# Patient Record
Sex: FEMALE | Race: WHITE | NOT HISPANIC OR LATINO | URBAN - METROPOLITAN AREA
[De-identification: names, ages, dates, MRNs, and addresses within clinical notes are randomized per-mention and may not be internally consistent; named-entity substitution may affect disease eponyms.]

---

## 2022-01-14 ENCOUNTER — TELEPHONE (OUTPATIENT)
Dept: SPORTS MEDICINE | Facility: CLINIC | Age: 77
End: 2022-01-14
Payer: MEDICARE

## 2022-01-14 NOTE — TELEPHONE ENCOUNTER
Spoke with patient and discussed Nusurface procedure with her as this is something we typically perform in a younger patient with specific clinical indications. Patient is going to send us her xrays for review and would like a phone call once Dr. Pike looks at her images to see if she is a candidate.     Kindra Akbar   Clinical Assistant to Dr. Eb Pike    ----- Message from Kindra Akbar sent at 1/13/2022  5:15 PM CST -----  Regarding: FW: speak with nurse  Contact: patient    ----- Message -----  From: Dottie Leo  Sent: 1/13/2022   4:26 PM CST  To: [facility] Staff  Subject: speak with nurse                                 [phone]  please call patient would like to speak with the nurse said she wants to know if the doctor performs a procedure called NUSurface if so she would like to schedule a consult waiting on a call back thanks.

## 2022-08-03 ENCOUNTER — TELEPHONE (OUTPATIENT)
Dept: SPORTS MEDICINE | Facility: CLINIC | Age: 77
End: 2022-08-03
Payer: MEDICARE

## 2022-08-03 NOTE — TELEPHONE ENCOUNTER
----- Message from Serene La sent at 8/3/2022  2:52 PM CDT -----  Yamel Raman was telephone today to discuss information regarding the Novocart 3D trials (IRB#2017.333), Eb Pike MD. I asked that the patient return my call.     Thank you,  Serene  ----- Message -----  From: Kindra Akbar  Sent: 7/27/2022   9:07 AM CDT  To: Serene Cast,    This patient was a possible Nusurface that I sent you a message about earlier this week, did you happen to speak with her about an MRI? We have not seen her in clinic yet     Kindra Akbar   Clinical Assistant to Dr. Eb Pike   ----- Message -----  From: Martin Mcmanus  Sent: 7/27/2022   9:04 AM CDT  To: [facility] Staff         Type: Patient Returning Call    Who Called: Patient   Who Left Message for Patient: NA    Does the patient know what this is regarding?: Needs the provider to call the clinic to authorize them to do a MRI on her right knee. Also needs notes of technician for MRI. She requests that the provider calls the MRI department at [facility] [phone] to authorize the MRI.     Would the patient rather a call back or a response via [facility]? Call  Best Call Back Number: [phone]  Additional Information: Please assist, thank you!

## 2022-08-03 NOTE — TELEPHONE ENCOUNTER
Yamel Rondon was telephone today to discuss information regarding the Novocart 3D trials (IRB#2017.333), Eb Pike MD. I asked that the patient return my call.